# Patient Record
Sex: FEMALE | Race: WHITE | NOT HISPANIC OR LATINO | Employment: UNEMPLOYED | URBAN - METROPOLITAN AREA
[De-identification: names, ages, dates, MRNs, and addresses within clinical notes are randomized per-mention and may not be internally consistent; named-entity substitution may affect disease eponyms.]

---

## 2018-11-01 ENCOUNTER — HOSPITAL ENCOUNTER (OUTPATIENT)
Dept: RADIOLOGY | Facility: HOSPITAL | Age: 43
Discharge: HOME | End: 2018-11-01
Attending: INTERNAL MEDICINE
Payer: COMMERCIAL

## 2018-11-01 ENCOUNTER — INITIAL CONSULT (OUTPATIENT)
Dept: RHEUMATOLOGY | Facility: CLINIC | Age: 43
End: 2018-11-01
Payer: COMMERCIAL

## 2018-11-01 VITALS
SYSTOLIC BLOOD PRESSURE: 162 MMHG | HEIGHT: 63 IN | DIASTOLIC BLOOD PRESSURE: 99 MMHG | BODY MASS INDEX: 40.74 KG/M2 | HEART RATE: 87 BPM | WEIGHT: 229.94 LBS

## 2018-11-01 DIAGNOSIS — M72.2 PLANTAR FASCIITIS: ICD-10-CM

## 2018-11-01 DIAGNOSIS — M19.049 HAND ARTHRITIS: ICD-10-CM

## 2018-11-01 DIAGNOSIS — M00.29: ICD-10-CM

## 2018-11-01 DIAGNOSIS — M00.29: Primary | ICD-10-CM

## 2018-11-01 PROCEDURE — 73630 X-RAY EXAM OF FOOT: CPT | Mod: 50,TC,FY,PO

## 2018-11-01 PROCEDURE — 99205 OFFICE O/P NEW HI 60 MIN: CPT | Mod: S$GLB,,, | Performed by: INTERNAL MEDICINE

## 2018-11-01 PROCEDURE — 3008F BODY MASS INDEX DOCD: CPT | Mod: CPTII,S$GLB,, | Performed by: INTERNAL MEDICINE

## 2018-11-01 PROCEDURE — 73130 X-RAY EXAM OF HAND: CPT | Mod: 50,TC,FY,PO

## 2018-11-01 PROCEDURE — 99999 PR PBB SHADOW E&M-NEW PATIENT-LVL IV: CPT | Mod: PBBFAC,,, | Performed by: INTERNAL MEDICINE

## 2018-11-01 PROCEDURE — 73630 X-RAY EXAM OF FOOT: CPT | Mod: 26,50,, | Performed by: RADIOLOGY

## 2018-11-01 PROCEDURE — 73130 X-RAY EXAM OF HAND: CPT | Mod: 26,50,, | Performed by: RADIOLOGY

## 2018-11-01 RX ORDER — CEFDINIR 300 MG/1
CAPSULE ORAL
Refills: 0 | COMMUNITY
Start: 2018-08-03 | End: 2018-11-01

## 2018-11-01 ASSESSMENT — ROUTINE ASSESSMENT OF PATIENT INDEX DATA (RAPID3)
MDHAQ FUNCTION SCORE: .3
PSYCHOLOGICAL DISTRESS SCORE: 0
TOTAL RAPID3 SCORE: 1.67
PATIENT GLOBAL ASSESSMENT SCORE: 2
PAIN SCORE: 2

## 2018-11-01 NOTE — PATIENT INSTRUCTIONS
Ibprofen 600mg twice dialy-three time daily as needed for any arthritis or foot pain    OR    Aleve 440mg twice daily

## 2018-11-01 NOTE — PROGRESS NOTES
Subjective:          Chief Complaint: Jazz Cordova is a 43 y.o. female who had concerns including elevated C-reactive protein (referral from Dr. Rojas).    HPI:    Patient is a 43-year-old woman referred by her primary care physician Dr. JACKSON she is being referred for some abnormalities on her recent labs including C reactive protein.  Patient was seen in May following what appears to be a febrile illness with ASO titers that were elevated.  Reportedly having some renal problems but she did have CMP done with Dr. JACKSON showing a normal GFR normal creatinine but for 3.5 weeks in May patient developed swelling in in various joints with pain and stiffness with bilateral edema.   No definitive illness in self at that time, but does have 4 children. She was having fevers during this time. She was treated with Omnicef and prednisone taper.   She was seen in ER during this time with some changes but suspected this UA but clean catch was negative. She describes warmth with all her joints.   She developed rash (erythematous) on her legs that waxed and waned.     Specifically patient was having extreme pain in both soles in feet upon initial walking in AM, some abatement with pain, but persists all day.   She has some stiffness in hands and reduction in  strength. Does refinish furniture.  Uses hands. Bulk of pain at the 2/3 MCPs exacerbated with used.   States no real joint pain prior to illness 5/2018.   She is using some NSAIDs, rare, does help.     Hx of PE bilateral with last pregnancy.     Serologies include   VLAD direct negative  Rheumatoid factor negative  C reactive protein was 48.1 high  ASO titer was also elevated 281  Her C3 was elevated C4 within normal limits  Smooth muscle antibody negative  Mitochondrial antibody negative  HLA B27 negative  SSA negative    Vitamin-D low 25  Double-stranded DNA negative  Thyroid peroxidase antibody negative      REVIEW OF SYSTEMS:    Review of Systems   Constitutional:  Negative for fever, malaise/fatigue and weight loss.   HENT: Negative for sore throat.    Eyes: Negative for double vision, photophobia and redness.   Respiratory: Negative for cough, shortness of breath and wheezing.    Cardiovascular: Negative for chest pain, palpitations and orthopnea.   Gastrointestinal: Negative for abdominal pain, constipation and diarrhea.   Genitourinary: Negative for dysuria, hematuria and urgency.   Musculoskeletal: Positive for joint pain. Negative for back pain and myalgias.   Skin: Negative for rash.   Neurological: Negative for dizziness, tingling, focal weakness and headaches.   Endo/Heme/Allergies: Does not bruise/bleed easily.   Psychiatric/Behavioral: Negative for depression, hallucinations and suicidal ideas.               Objective:            Past Medical History:   Diagnosis Date    Blood dyscrasia     Pulmonary emboli 03/2012     History reviewed. No pertinent family history.  Social History     Tobacco Use    Smoking status: Never Smoker   Substance Use Topics    Alcohol use: No    Drug use: No         Current Outpatient Medications on File Prior to Visit   Medication Sig Dispense Refill    [DISCONTINUED] cefdinir (OMNICEF) 300 MG capsule TK 1 C PO BID  0    [DISCONTINUED] clindamycin (CLEOCIN) 150 MG capsule Take 150 mg by mouth 4 (four) times daily.      [DISCONTINUED] enoxaparin (LOVENOX) 100 mg/mL Syrg Inject 1 mg/kg into the skin every 12 (twelve) hours.      [DISCONTINUED] prenatal vit #108-iron-FA 30 mg iron- 800 mcg Tab Take 1 tablet by mouth.       No current facility-administered medications on file prior to visit.        Vitals:    11/01/18 1039   BP: (!) 162/99   Pulse: 87       Physical Exam:    Physical Exam   Constitutional: She is oriented to person, place, and time. She appears well-developed and well-nourished.   HENT:   Head: Normocephalic and atraumatic.   Mouth/Throat: Oropharynx is clear and moist.   Eyes: EOM are normal. Pupils are equal,  round, and reactive to light.   Neck: Normal range of motion.   Cardiovascular: Normal rate, regular rhythm and normal heart sounds.   Pulmonary/Chest: Effort normal and breath sounds normal.   Musculoskeletal:        Right shoulder: She exhibits normal range of motion, no tenderness and no swelling.        Left shoulder: She exhibits normal range of motion, no tenderness and no swelling.        Right elbow: She exhibits normal range of motion and no swelling. No tenderness found.        Left elbow: She exhibits normal range of motion and no swelling. No tenderness found.        Right wrist: She exhibits normal range of motion, no tenderness and no swelling.        Left wrist: She exhibits normal range of motion, no tenderness and no swelling.        Right knee: She exhibits normal range of motion and no swelling. No tenderness found.        Left knee: She exhibits normal range of motion and no swelling. No tenderness found.        Right hand: She exhibits normal range of motion, no tenderness and no swelling.        Left hand: She exhibits normal range of motion, no tenderness and no swelling.        Right foot: There is normal range of motion, no tenderness and no swelling.        Left foot: There is normal range of motion, no tenderness and no swelling.   No joint swelling of PIP, MCP, wrist, elbow, shoulder, or knee joints  Tenderness of the PIP and DIP only  + tenderness of the plantar fascia   Neurological: She is alert and oriented to person, place, and time.   Skin: Skin is warm and dry.   Psychiatric: She has a normal mood and affect. Her behavior is normal.             Assessment:       Encounter Diagnoses   Name Primary?    Poststreptococcal arthritis of multiple sites Yes    Plantar fasciitis     Hand arthritis           Plan:        Poststreptococcal arthritis of multiple sites  -     Sedimentation rate; Future; Expected date: 11/01/2018  -     C-reactive protein; Future; Expected date: 11/01/2018  -      X-Ray Hand 3 View Bilateral; Future; Expected date: 11/01/2018  -     X-Ray Foot Complete Bilateral; Future; Expected date: 11/01/2018    Plantar fasciitis  -     Sedimentation rate; Future; Expected date: 11/01/2018  -     C-reactive protein; Future; Expected date: 11/01/2018  -     X-Ray Hand 3 View Bilateral; Future; Expected date: 11/01/2018  -     X-Ray Foot Complete Bilateral; Future; Expected date: 11/01/2018  -     Ambulatory Referral to Physical/Occupational Therapy    Hand arthritis  -     Sedimentation rate; Future; Expected date: 11/01/2018  -     C-reactive protein; Future; Expected date: 11/01/2018  -     X-Ray Hand 3 View Bilateral; Future; Expected date: 11/01/2018  -     X-Ray Foot Complete Bilateral; Future; Expected date: 11/01/2018    Likely post strep reactive arthritis now resolved  MCP/PIP arthalgia most likely degenerative  Plantar fasciitis refer to PT  NSAIDs PRN  Elevated C4: reassurance this was just associated with inflammation no evidence of CTD.     Follow-up in about 4 months (around 3/1/2019).      60min consultation with greater than 50% spent in counseling, chart review and coordination of care. All questions answered.  Thank you for allowing me to participate in the care of this very pleasant patient.

## 2018-11-01 NOTE — LETTER
November 7, 2018      Gilberto Adams MD  [address] [facility] [address]           [facility]  [address] [facility] [address] [place] [address]  Phone: [phone]  Fax: [phone]          Patient: Jazz Cordova   MR Number: [id number]   YOB: 1975   Date of Visit: 11/1/2018       Dear Dr. Gilberto Adams:    Thank you for referring Jazz Cordova to me for evaluation. Attached you will find relevant portions of my assessment and plan of care.    If you have questions, please do not hesitate to call me. I look forward to following Jazz Cordova along with you.    Sincerely,    Vargas Silva  CC:  No Recipients    If you would like to receive this communication electronically, please contact [email or web address] or [phone] to request more information on [employer] Link access.    For providers and/or their staff who would like to refer a patient to [facility], please contact us through our one-stop-shop provider referral line, [facility], at [phone].    If you feel you have received this communication in error or would no longer like to receive these types of communications, please e-mail [email or web address]

## 2018-11-19 ENCOUNTER — TELEPHONE (OUTPATIENT)
Dept: RHEUMATOLOGY | Facility: CLINIC | Age: 43
End: 2018-11-19

## 2018-11-19 NOTE — TELEPHONE ENCOUNTER
----- Message from Timoteo Amaya sent at 11/19/2018 10:36 AM CST -----  Contact: Patient [phone]  Patient states she received her test results via [facility]. She has some questions regarding how to follow up? Please call to advise.

## 2018-11-20 NOTE — TELEPHONE ENCOUNTER
With regard to her feet there was on the left great toe Um something known as a subcortical cyst or geode formation she has to sign at the some arthritis in a break in the cartilage at that big toe joint on the left side    Otherwise there is no evidence for any erosions are any changes that would be worrisome for rheumatoid arthritis or other inflammatory arthritis     thanks Dr. Graff

## 2018-11-21 NOTE — TELEPHONE ENCOUNTER
Spoke to the patient concerning damage to her cartilage on great left toe, but no rheumatoid or inflammatory arthritis evidence. Patient voices understanding. Titus

## 2020-09-15 ENCOUNTER — TELEPHONE (OUTPATIENT)
Dept: OTOLARYNGOLOGY | Facility: CLINIC | Age: 45
End: 2020-09-15

## 2020-09-15 NOTE — TELEPHONE ENCOUNTER
----- Message from Whitney Clinton sent at 9/15/2020  1:51 PM CDT -----  Type: Needs Medical Advice  Who Called:  new patient  Best Call Back Number: [phone]  Additional Information: wants to know it the office received referral to schedule. Please give call back to confirm. Thanks

## 2020-09-15 NOTE — TELEPHONE ENCOUNTER
lmov about not receiving a referral. Informed that we do not require a referral to see the provider unless her insurance requires it. Advised to call an schedule appt at her convenience.

## 2020-09-29 ENCOUNTER — HOSPITAL ENCOUNTER (EMERGENCY)
Facility: HOSPITAL | Age: 45
Discharge: HOME | End: 2020-09-29
Attending: EMERGENCY MEDICINE
Payer: COMMERCIAL

## 2020-09-29 VITALS
RESPIRATION RATE: 16 BRPM | OXYGEN SATURATION: 96 % | BODY MASS INDEX: 41.11 KG/M2 | SYSTOLIC BLOOD PRESSURE: 146 MMHG | WEIGHT: 232 LBS | DIASTOLIC BLOOD PRESSURE: 83 MMHG | HEART RATE: 85 BPM | TEMPERATURE: 99 F | HEIGHT: 63 IN

## 2020-09-29 DIAGNOSIS — I10 HYPERTENSION, UNSPECIFIED TYPE: Primary | ICD-10-CM

## 2020-09-29 LAB
ALBUMIN SERPL BCP-MCNC: 3.4 G/DL (ref 3.5–5.2)
ALP SERPL-CCNC: 62 U/L (ref 55–135)
ALT SERPL W/O P-5'-P-CCNC: 27 U/L (ref 10–44)
ANION GAP SERPL CALC-SCNC: 7 MMOL/L (ref 8–16)
APTT PPP: 24.7 SEC (ref 23.6–33.3)
AST SERPL-CCNC: 23 U/L (ref 10–40)
BASOPHILS # BLD AUTO: 0.03 K/UL (ref 0–0.2)
BASOPHILS NFR BLD: 0.4 % (ref 0–1.9)
BILIRUB SERPL-MCNC: 0.5 MG/DL (ref 0.1–1)
BUN SERPL-MCNC: 10 MG/DL (ref 6–20)
CALCIUM SERPL-MCNC: 8.7 MG/DL (ref 8.7–10.5)
CHLORIDE SERPL-SCNC: 102 MMOL/L (ref 95–110)
CO2 SERPL-SCNC: 30 MMOL/L (ref 23–29)
CREAT SERPL-MCNC: 0.9 MG/DL (ref 0.5–1.4)
DIFFERENTIAL METHOD: ABNORMAL
EOSINOPHIL # BLD AUTO: 0.1 K/UL (ref 0–0.5)
EOSINOPHIL NFR BLD: 1.6 % (ref 0–8)
ERYTHROCYTE [DISTWIDTH] IN BLOOD BY AUTOMATED COUNT: 13.6 % (ref 11.5–14.5)
EST. GFR  (AFRICAN AMERICAN): >60 ML/MIN/1.73 M^2
EST. GFR  (NON AFRICAN AMERICAN): >60 ML/MIN/1.73 M^2
GLUCOSE SERPL-MCNC: 126 MG/DL (ref 70–110)
HCT VFR BLD AUTO: 41.7 % (ref 37–48.5)
HGB BLD-MCNC: 13.4 G/DL (ref 12–16)
IMM GRANULOCYTES # BLD AUTO: 0.03 K/UL (ref 0–0.04)
IMM GRANULOCYTES NFR BLD AUTO: 0.4 % (ref 0–0.5)
INR PPP: 1
LYMPHOCYTES # BLD AUTO: 2 K/UL (ref 1–4.8)
LYMPHOCYTES NFR BLD: 27.3 % (ref 18–48)
MCH RBC QN AUTO: 26.4 PG (ref 27–31)
MCHC RBC AUTO-ENTMCNC: 32.1 G/DL (ref 32–36)
MCV RBC AUTO: 82 FL (ref 82–98)
MONOCYTES # BLD AUTO: 0.6 K/UL (ref 0.3–1)
MONOCYTES NFR BLD: 8.7 % (ref 4–15)
NEUTROPHILS # BLD AUTO: 4.5 K/UL (ref 1.8–7.7)
NEUTROPHILS NFR BLD: 61.6 % (ref 38–73)
NRBC BLD-RTO: 0 /100 WBC
PLATELET # BLD AUTO: 232 K/UL (ref 150–350)
PMV BLD AUTO: 11.1 FL (ref 9.2–12.9)
POTASSIUM SERPL-SCNC: 3.5 MMOL/L (ref 3.5–5.1)
PROT SERPL-MCNC: 6.2 G/DL (ref 6–8.4)
PROTHROMBIN TIME: 12.8 SEC (ref 10.6–14.8)
RBC # BLD AUTO: 5.08 M/UL (ref 4–5.4)
SODIUM SERPL-SCNC: 139 MMOL/L (ref 136–145)
TROPONIN I SERPL DL<=0.01 NG/ML-MCNC: <0.03 NG/ML
TSH SERPL DL<=0.005 MIU/L-ACNC: 1.64 UIU/ML (ref 0.34–5.6)
WBC # BLD AUTO: 7.28 K/UL (ref 3.9–12.7)

## 2020-09-29 PROCEDURE — 84484 ASSAY OF TROPONIN QUANT: CPT

## 2020-09-29 PROCEDURE — 93005 ELECTROCARDIOGRAM TRACING: CPT | Performed by: SPECIALIST

## 2020-09-29 PROCEDURE — 85610 PROTHROMBIN TIME: CPT

## 2020-09-29 PROCEDURE — 85025 COMPLETE CBC W/AUTO DIFF WBC: CPT

## 2020-09-29 PROCEDURE — 80053 COMPREHEN METABOLIC PANEL: CPT

## 2020-09-29 PROCEDURE — 93010 EKG 12-LEAD: ICD-10-PCS | Mod: ,,, | Performed by: SPECIALIST

## 2020-09-29 PROCEDURE — 99285 EMERGENCY DEPT VISIT HI MDM: CPT | Mod: 25

## 2020-09-29 PROCEDURE — 84443 ASSAY THYROID STIM HORMONE: CPT

## 2020-09-29 PROCEDURE — 85730 THROMBOPLASTIN TIME PARTIAL: CPT

## 2020-09-29 PROCEDURE — 93010 ELECTROCARDIOGRAM REPORT: CPT | Mod: ,,, | Performed by: SPECIALIST

## 2020-09-29 RX ORDER — DIAZEPAM 2 MG/1
2 TABLET ORAL 2 TIMES DAILY PRN
COMMUNITY
Start: 2020-09-14

## 2020-09-29 RX ORDER — PREDNISONE 10 MG/1
TABLET ORAL
COMMUNITY
Start: 2020-09-14

## 2020-09-29 RX ORDER — AMLODIPINE BESYLATE 5 MG/1
5 TABLET ORAL DAILY
Qty: 60 TABLET | Refills: 0 | Status: SHIPPED | OUTPATIENT
Start: 2020-09-29 | End: 2020-11-28

## 2020-09-29 RX ORDER — ACETAMINOPHEN 500 MG
1000 TABLET ORAL ONCE AS NEEDED
COMMUNITY

## 2020-09-29 NOTE — ED TRIAGE NOTES
"Present to the ER with , pt's  Mr. Flores states " the doctor reccommended she come because of her high blood pressure" " lately she's been having dizzy spell, she's went to the doctor for vertigo and they gave her a prescription for valium, a real low dose" pt has returned from [place], states " my heart is racing, having headaches, left side of my face feels funny" symptoms since " they started this weekend, off and on, blood pressure been real high" reports ' sunday night it was 147/10, this morning at home 195/117" reports she contacted her pmd and she was seen by her pmd today, pt is not on hypertensive med, denies CP/SOB, reports pain between shoulder blade, rates 5/10  "

## 2020-09-29 NOTE — ED PROVIDER NOTES
Encounter Date: 2020       History     Chief Complaint   Patient presents with    Hypertension     ONSET  NITE     HPI   45-year-old female with past medical history of pulmonary embolus during pregnancy, presents with new onset hypertension.  The patient has been having episodes of vertigo, 2 weeks ago her primary care physician prescribed low-dose Valium with a course of prednisone.  Over this weekend, the patient began feeling intermittent palpitations, a left-sided headache and some left facial paresthesias.  No weakness, no facial droop.  Patient took her blood pressure at home and it was 200/100.  She has no history of hypertension.  She denies any visual changes, chest pain or shortness of breath.    Review of patient's allergies indicates:   Allergen Reactions    Penicillins Hives    Morphine      jittery    Demerol (pf) [meperidine (pf)] Itching     Past Medical History:   Diagnosis Date    Blood dyscrasia     Obese     Pulmonary emboli 2012     Past Surgical History:   Procedure Laterality Date     SECTION       No family history on file.  Social History     Tobacco Use    Smoking status: Never Smoker   Substance Use Topics    Alcohol use: No    Drug use: No     Review of Systems   Constitutional: Negative for chills and fever.   Eyes: Negative for photophobia and visual disturbance.   Respiratory: Negative for cough and shortness of breath.    Cardiovascular: Positive for palpitations. Negative for chest pain.   Gastrointestinal: Negative for abdominal pain and vomiting.   Genitourinary: Negative for dysuria and flank pain.   Musculoskeletal: Negative for back pain and neck pain.   Skin: Negative for pallor and rash.   Neurological: Positive for dizziness and numbness. Negative for weakness.   Psychiatric/Behavioral: Negative for agitation and confusion.       Physical Exam     Initial Vitals [20 1349]   BP Pulse Resp Temp SpO2   (!) 236/103 98 18 99.5 °F (37.5 °C)  99 %      MAP       --         Physical Exam    Nursing note and vitals reviewed.  Constitutional: She appears well-developed and well-nourished.   HENT:   Head: Normocephalic and atraumatic.   Mouth/Throat: Oropharynx is clear and moist.   Eyes: EOM are normal. Pupils are equal, round, and reactive to light.   No nystagmus on exam   Neck: Normal range of motion. No JVD present.   Cardiovascular: Normal rate, regular rhythm, normal heart sounds and intact distal pulses.   No murmur heard.  Pulmonary/Chest: Breath sounds normal. No respiratory distress. She has no rales.   Abdominal: Soft. She exhibits no distension. There is no abdominal tenderness.   Musculoskeletal: Normal range of motion. No edema.   Neurological: She is alert and oriented to person, place, and time. She has normal strength. No sensory deficit. GCS score is 15. GCS eye subscore is 4. GCS verbal subscore is 5. GCS motor subscore is 6.   5/5 strength in all extremities.  No facial droop.  No pronator drift.  Heel and toe walking intact, steady gait, negative Romberg.   Skin: Skin is warm and dry.   Psychiatric: She has a normal mood and affect. Thought content normal.         ED Course   Procedures  Labs Reviewed   CBC W/ AUTO DIFFERENTIAL - Abnormal; Notable for the following components:       Result Value    Mean Corpuscular Hemoglobin 26.4 (*)     All other components within normal limits   COMPREHENSIVE METABOLIC PANEL   PROTIME-INR   TSH   APTT   TROPONIN I   POCT GLUCOSE MONITORING CONTINUOUS        ECG Results          ECG 12 lead (In process)  Result time 09/29/20 14:23:30    In process by Interface, Lab In [facility] (09/29/20 14:23:30)                 Narrative:    Test Reason : I63.9,    Vent. Rate : 082 BPM     Atrial Rate : 082 BPM     P-R Int : 148 ms          QRS Dur : 108 ms      QT Int : 372 ms       P-R-T Axes : 026 -47 061 degrees     QTc Int : 434 ms    Normal sinus rhythm  Left anterior fascicular block  Abnormal ECG  No previous  ECGs available    Referred By: [email or web address]   SELF           Confirmed By:                             Imaging Results          CT Head Without Contrast (Final result)  Result time 09/29/20 14:46:07    Final result by Fady Aguila MD (09/29/20 14:46:07)                 Narrative:    CMS MANDATED QUALITY DATA - CT RADIATION  436    All CT scans at this facility utilize dose modulation, iterative  reconstruction, and/or weight based dosing when appropriate to reduce  radiation dose to as low as reasonably achievable.    CT HEAD WITHOUT CONTRAST    CLINICAL HISTORY:  45 years Female Neuro deficit, acute, stroke suspected    COMPARISON: None    FINDINGS: Negative for acute intracranial hemorrhage, midline shift,  or mass effect. Ventricles and sulci are normal in size. Gray-white  differentiation is maintained. Cerebellar hemispheres and brainstem  are unremarkable.    No calvarial lesion or fracture. Visualized paranasal sinuses and  mastoid air cells are clear.    IMPRESSION: No CT evidence of acute intracranial pathology.    Electronically Signed by Fady QUIÑONES on 9/29/2020 2:50 PM                             X-Ray Chest AP Portable (Final result)  Result time 09/29/20 14:24:06    Final result by Chris Poon MD (09/29/20 14:24:06)                 Narrative:    REASON: Stroke    FINDINGS:    Portable chest radiograph with no comparison. The cardiomediastinal  silhouette is within normal limits in size.The pulmonary vascular  structures are within normal limits. The lungs are clear. No acute  osseous abnormality.    IMPRESSION:    No acute cardiopulmonary process.    Electronically Signed by Chris Poon on 9/29/2020 2:26 PM                               Medical Decision Making:   Initial Assessment:   In brief, 45 y.o. presents with new onset hypertension with several days of altered sensation to the left side of her face.   Vitals are significant for initial blood pressure 200/100, down to 146/83  without intervention  Physical exam is reassuring with no focal neurologic deficit  Labs including troponin, creatinine, TSH are within normal limits  Chest x-ray clear, EKG nonischemic  CT head negative for any subacute infarct   Blood pressure may have been exacerbated by course of steroids prescribed for vertigo.  Plan to discharge on amlodipine, advised patient to take daily blood pressure readings and keep a log for PCP.  Patient has close PCP follow-up.    Jeri Hunt MD  [facility] Residency PGY-3  09/29/2020 9:41 PM                   Attending Attestation:   Physician Attestation Statement for Resident:  As the supervising MD   Physician Attestation Statement: I have personally seen and examined this patient.   I agree with the above history. -: Patient presents with headache and symptoms of vertigo   As the supervising MD I agree with the above PE.   -: Patient alert.  Blood pressure improved.  No neurologic deficits.  No cerebellar deficits.   As the supervising MD I agree with the above treatment, course, plan, and disposition.   -: Patient presents with very accelerated hypertension with some symptoms of headache and vertigo.  Workup is unremarkable here.  Patient has no acute neurologic deficits.  Will begin Norvasc.  Patient will follow with her primary care physician in next 48 hours for repeat blood pressure check.  I have reviewed and agree with the residents interpretation of the following: lab data and x-rays.  I have reviewed the following: old records at this facility.                    ED Course as of Sep 29 2124   Tue Sep 29, 2020   1647 ECG 12 lead [EC]      ED Course User Index  [EC] Jeri Hunt MD            Clinical Impression:       ICD-10-CM ICD-9-CM   1. Hypertension, unspecified type  I10 401.9                                               Jeri Hunt MD  Resident  09/29/20 2145       Hubert Woodson MD  09/29/20 2221

## 2020-09-29 NOTE — FIRST PROVIDER EVALUATION
"Medical screening exam completed.  I have conducted a focused provider triage encounter, findings are as follows:    Brief history of present illness:  C/o dizziness . headaches and heart racing     Vitals:    09/29/20 1349   BP: (!) 236/103   BP Location: Left arm   Patient Position: Sitting   Pulse: 98   Resp: 18   Temp: 99.5 °F (37.5 °C)   TempSrc: Oral   SpO2: 99%   Weight: 105.2 kg (232 lb)   Height: 5' 3" (1.6 m)       Pertinent physical exam: reports treated by PCP last week for vertigo given steroids and valium continues to have symptoms and having elevated blood pressure readings      Brief workup plan:  Labs CXR    Preliminary workup initiated; this workup will be continued and followed by the physician or advanced practice provider that is assigned to the patient when roomed.  "

## 2020-10-07 ENCOUNTER — OFFICE VISIT (OUTPATIENT)
Dept: OTOLARYNGOLOGY | Facility: CLINIC | Age: 45
End: 2020-10-07
Payer: COMMERCIAL

## 2020-10-07 ENCOUNTER — CLINICAL SUPPORT (OUTPATIENT)
Dept: AUDIOLOGY | Facility: CLINIC | Age: 45
End: 2020-10-07
Payer: COMMERCIAL

## 2020-10-07 VITALS
DIASTOLIC BLOOD PRESSURE: 113 MMHG | HEART RATE: 87 BPM | TEMPERATURE: 97 F | BODY MASS INDEX: 41.08 KG/M2 | SYSTOLIC BLOOD PRESSURE: 179 MMHG | WEIGHT: 231.94 LBS

## 2020-10-07 DIAGNOSIS — R42 DIZZINESS: ICD-10-CM

## 2020-10-07 DIAGNOSIS — H90.3 BILATERAL HIGH FREQUENCY SENSORINEURAL HEARING LOSS: Primary | ICD-10-CM

## 2020-10-07 DIAGNOSIS — H81.11 BPPV (BENIGN PAROXYSMAL POSITIONAL VERTIGO), RIGHT: Primary | ICD-10-CM

## 2020-10-07 PROCEDURE — 99203 OFFICE O/P NEW LOW 30 MIN: CPT | Mod: 25,S$GLB,, | Performed by: OTOLARYNGOLOGY

## 2020-10-07 PROCEDURE — 99999 PR PBB SHADOW E&M-EST. PATIENT-LVL III: ICD-10-PCS | Mod: PBBFAC,,, | Performed by: OTOLARYNGOLOGY

## 2020-10-07 PROCEDURE — 92567 PR TYMPA2METRY: ICD-10-PCS | Mod: S$GLB,,, | Performed by: AUDIOLOGIST-HEARING AID FITTER

## 2020-10-07 PROCEDURE — 92557 COMPREHENSIVE HEARING TEST: CPT | Mod: S$GLB,,, | Performed by: AUDIOLOGIST-HEARING AID FITTER

## 2020-10-07 PROCEDURE — 99999 PR PBB SHADOW E&M-EST. PATIENT-LVL III: CPT | Mod: PBBFAC,,, | Performed by: OTOLARYNGOLOGY

## 2020-10-07 PROCEDURE — 99203 PR OFFICE/OUTPT VISIT, NEW, LEVL III, 30-44 MIN: ICD-10-PCS | Mod: 25,S$GLB,, | Performed by: OTOLARYNGOLOGY

## 2020-10-07 PROCEDURE — 92567 TYMPANOMETRY: CPT | Mod: S$GLB,,, | Performed by: AUDIOLOGIST-HEARING AID FITTER

## 2020-10-07 PROCEDURE — 3008F PR BODY MASS INDEX (BMI) DOCUMENTED: ICD-10-PCS | Mod: CPTII,S$GLB,, | Performed by: OTOLARYNGOLOGY

## 2020-10-07 PROCEDURE — 3008F BODY MASS INDEX DOCD: CPT | Mod: CPTII,S$GLB,, | Performed by: OTOLARYNGOLOGY

## 2020-10-07 PROCEDURE — 95992 CANALITH REPOSITIONING PROC: CPT | Mod: S$GLB,,, | Performed by: OTOLARYNGOLOGY

## 2020-10-07 PROCEDURE — 92557 PR COMPREHENSIVE HEARING TEST: ICD-10-PCS | Mod: S$GLB,,, | Performed by: AUDIOLOGIST-HEARING AID FITTER

## 2020-10-07 PROCEDURE — 95992 PR CANALITH REPOSITIONING PROCEDURE, PER DAY: ICD-10-PCS | Mod: S$GLB,,, | Performed by: OTOLARYNGOLOGY

## 2020-10-07 NOTE — PROGRESS NOTES
"Subjective:       Patient ID: Jazz Cordova is a 45 y.o. female.    Chief Complaint: Dizziness    Jazz is here for dizziness.   Length of symptoms: 3 months. Has been progressing during this time.  She describes a general dysequilibrium consistent with motion sickness that has worsening. May last hours to a day. Wearing earbuds will cause her to feel "off." She has extreme motion sensitivity.   She also has discrete episodes of vertigo with head movements, brief in nature.   She was seen by her PCP on 9/14 and dx with OME and given steroids and Valium.  She has had issues with htn recently, possibly attributed to      Pertinent meds: Amlodipine (recently started for Htn)  Remote h/o migraines    Social History     Tobacco Use   Smoking Status Never Smoker     Social History     Substance and Sexual Activity   Alcohol Use No          Review of Systems   Constitutional: Negative for activity change and appetite change.   Eyes: Negative for discharge.   Respiratory: Negative for difficulty breathing and wheezing   Cardiovascular: Negative for chest pain.   Gastrointestinal: Negative for abdominal distention and abdominal pain.   Endocrine: Negative for cold intolerance and heat intolerance.   Genitourinary: Negative for dysuria.   Musculoskeletal: Negative for gait problem and joint swelling.   Skin: Negative for color change and pallor.   Neurological: Negative for syncope and weakness.   Psychiatric/Behavioral: Negative for agitation and confusion.     Objective:        Constitutional:   She is oriented to person, place, and time. She appears well-developed and well-nourished. She appears alert. She is active. Normal speech.      Head:  Normocephalic and atraumatic. Head is without TMJ tenderness. No scars. Salivary glands normal.  Facial strength is normal.      Ears:    Right Ear: No drainage or swelling. No middle ear effusion.   Left Ear: No drainage or swelling.  No middle ear effusion.   Right Fanny-Hallpike - yes " vertigo, yes rotary nystagmus   Left Corunna-Hallpike - no vertigo, no rotary nystagmus   Horizontal Canal testing negative      Nose:  No mucosal edema, rhinorrhea or sinus tenderness. No turbinate hypertrophy.      Mouth/Throat  Oropharynx clear and moist without lesions or asymmetry, normal uvula midline and mirror exam normal. Normal dentition. No uvula swelling, lacerations or trismus. No oropharyngeal exudate. Tonsillar erythema, tonsillar exudate.      Neck:  Full range of motion with neck supple and no adenopathy. Thyroid tenderness is present. No tracheal deviation, no edema, no erythema, normal range of motion, no stridor, no crepitus and no neck rigidity present. No thyroid mass present.     Cardiovascular:   Intact distal pulses and normal pulses.      Pulmonary/Chest:   Effort normal and breath sounds normal. No stridor.     Psychiatric:   Her speech is normal and behavior is normal. Her mood appears not anxious. Her affect is not labile.     Neurological:   She is alert and oriented to person, place, and time. No sensory deficit.     Skin:   No abrasions, lacerations, lesions, or rashes. No abrasion and no bruising noted.         Tests / Results:  Audiogram: HF SNHL, otherwise normal and symmetric.    Patient: Jazz Cordova [id number], :1975  Procedure date:10/7/2020  Patient's medications, allergies, past medical, surgical, social and family histories were reviewed and updated as appropriate.  Chief Complaint:  Dizziness    HPI:  Jazz is a 45 y.o. female with benign paroxysmal positional vertigo (BPPV) refractory to medical therapy. Hallpike-Corunna maneuver demonstrates nystagmus of delayed onset that fatigues, rotary, clockwise, associated with vertigo.    Procedure: Risks, benefits, and alternatives of the procedure were discussed with the patient, and the patient consented to the Epley maneuver or canalith repositioning procedure (CRP).      With the patient sitting upright on the examination table,  the head was lowered to the supine position and the neck rotated 45 degrees to the right side; once nystagmus fatigued, the body and head were slowly rotated to the opposite side 90 degrees, and then after 30-60 seconds the rotation continued another 90 degrees (they rolled onto their shoulder and were looking downwards at a 45 degree angle). After the nystagmus resolved, the patient was gently allowed to sit up with neck flexion.    There were no complications and the patient tolerated it well.  Post-procedure instructions were given.    Lucho Beckwith performed the entire procedure.      Assessment:       1. BPPV (benign paroxysmal positional vertigo), right    2. Dizziness          Plan:         Right Epley performed  Instructions given  FU 1-2 weeks for re-check    Her motion intolerance is unlikely vestibular in origin but fu if worsening or changing

## 2020-10-07 NOTE — PATIENT INSTRUCTIONS
"Benign Paroxysmal Positional Vertigo  Benign paroxysmal positional vertigo (BPPV) is a problem with the inner ear. The inner ear contains the vestibular system (balance organ). BPPV causes a feeling of spinning. It is a common problem of the vestibular system.    Understanding BPPV  The vestibular system (balance organ) of the ear is made up of very tiny parts. They include the utricle, saccule, and semicircular canals. The utricle is a tiny organ that contains calcium crystals. In some people, the crystals can move into the semicircular canals. When this happens, the system no longer works as it should. This causes BPPV.     What causes BPPV?  Causes include injury to your head or neck, other balance disorders; but for most, the cause is unknown.    Symptoms of BPPV  You many have repeated feelings of spinning (vertigo). The vertigo usually lasts less than 1 minute. Some movements, suchas rolling over in bed, can bring on vertigo.    Treatment for BPPV  Your health care provider may try to move the calcium crystals. This is done by having you move your head and neck in certain ways. This treatment is safe and often works well. You may also be told to do these movements at home. You may still have vertigo for a few weeks. Your health care provider will recheck your symptoms, usually in a few weeks. Special physical therapy may also be part of treatment.  In rare cases surgery may be needed for BPPV that does not go away.    INSTRUCTIONS FOR PATIENTS AFTER OFFICE TREATMENTS     1. Wait until you are not feeling dizzy / nauseated before you go home.     2. In general, there are no restrictions following repositioning of the crystals. You will want to avoid rapid movements of your head or extreme position changes in your head for next day or two. Sleep on an extra pillow for the 1 night. You may want to avoid sleeping on the "bad" side for a few days.    3. Following a few days, you can resume normal activities. It is " normal to have some dizziness for a few days - weeks following the procedure, but it should generally be improving / improved by that point.     If you continue to have vertigo, you may be instructed to perform some exercises at home. These can be easily found on YouTube. Be sure to perform the exercise for the SIDE OF THE AFFECTED EAR.     Exercises include:  1. Epley Maneuver  2. Semont Maneuver  3. Wells-Daroff Maneuver  4.

## 2020-10-07 NOTE — PROGRESS NOTES
Jazz Cordova was seen 10/07/2020 for an audiological evaluation. Patient complains of dizziness. 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 She denies tinnitus and family Hx of hearing loss.     Results reveal a bilateral normal through 4K Hz sloping to moderately severe HF sensorineural hearing loss.    Speech Reception Thresholds were  0 dBHL for the right ear and 0 dBHL for the left ear.    Word recognition scores were excellent for the right ear and excellent for the left ear.   Tympanograms were Type A for the right ear and Type A for the left ear.    Audiogram results were reviewed in detail with patient and all questions were answered. Results will be reviewed by ENT at the completion of this note. Recommend further medical evaluation to rule out vertigo, repeat hearing test in one year and hearing protection in loud noise.

## 2021-04-29 ENCOUNTER — PATIENT MESSAGE (OUTPATIENT)
Dept: RESEARCH | Facility: HOSPITAL | Age: 46
End: 2021-04-29